# Patient Record
Sex: FEMALE | Race: BLACK OR AFRICAN AMERICAN
[De-identification: names, ages, dates, MRNs, and addresses within clinical notes are randomized per-mention and may not be internally consistent; named-entity substitution may affect disease eponyms.]

---

## 2017-12-30 NOTE — EDM.PDOC
ED HPI GENERAL MEDICAL PROBLEM





- General


Chief Complaint: Eye Problems


Stated Complaint: EYE PAIN, BOTH


Time Seen by Provider: 17 12:33


Source of Information: Reports: Patient


History Limitations: Reports: No Limitations





- History of Present Illness


INITIAL COMMENTS - FREE TEXT/NARRATIVE: 


History of present illness:


[47-year-old female comes in complaining of bilateral eye inflammation with 

pain. Patient indicates that she had gotten some mail order contacts and upon 

placing them in her eyes noticed redness and pain. Patient case that her 

optometrist as well as her ophthalmologist available to her so she came here 

seeking help.]





Review of systems: 


As per history of present illness and below otherwise all systems reviewed and 

negative.





Past medical history: 


As per history of present illness and as reviewed below otherwise 

noncontributory.





Surgical history: 


As per history of present illness and as reviewed below otherwise 

noncontributory.





Social history: 


No reported history of drug or alcohol abuse.





Family history: 


As per history of present illness and as reviewed below otherwise 

noncontributory.





Physical exam:


HEENT: Atraumatic, normocephalic, pupils reactive, bilateral conjunctival 

erythema without exudate, mucous membranes moist, throat clear, neck supple, 

nontender, trachea midline.


Lungs: Clear to auscultation, breath sounds equal bilaterally, chest nontender.


Heart: S1S2, regular, negative for clicks, rubs, or JVD.


Abdomen: Soft, nondistended, nontender. Negative for masses or 

hepatosplenomegaly. Negative for costovertebral tenderness.


Pelvis: Stable nontender.


Genitourinary: Deferred.


Rectal: Deferred.


Extremities: Atraumatic, negative for cords or calf pain. Neurovascular 

unremarkable.


Neuro: Awake, alert, oriented. Cranial nerves II through XII unremarkable. 

Cerebellum unremarkable. Motor and sensory unremarkable throughout. Exam 

nonfocal.








Global assessment is benign save the subjective complaint as noted in history 

of present illness





Diagnostics:


[]





Therapeutics:


[]





Impression: 


[#1 bilateral conjunctivitis]





Plan:


[Tobramycin eye ointment follow-up with optometrist]





Definitive disposition and diagnosis as appropriate pending reevaluation and 

review of above.











- Related Data


 Allergies











Allergy/AdvReac Type Severity Reaction Status Date / Time


 


morphine Allergy  difficulty Verified 05/18/15 21:16





   in  





   breathing  











Home Meds: 


 Home Meds





Dexamethasone/Tobramycin [Tobradex Ophth Oint] 3.5 gm EYEBOTH Q4H #1 tube  [Rx]











Past Medical History


Other OB/BYN History:  x 4





Social & Family History





- Tobacco Use


Smoking Status *Q: Never Smoker





- Alcohol Use


Days Per Week of Alcohol Use: 0





- Recreational Drug Use


Recreational Drug Use: No





ED ROS GENERAL





- Review of Systems


Review Of Systems: See Below (See history of present illness)





ED EXAM GENERAL W FULL EYE





- Physical Exam


Exam: See Below (See history of present illness)





Departure





- Departure


Time of Disposition: 12:35


Disposition: Home, Self-Care 01


Condition: Good


Clinical Impression: 


 Conjunctivitis








- Discharge Information


Prescriptions: 


Dexamethasone/Tobramycin [Tobradex Ophth Oint] 3.5 gm EYEBOTH Q4H #1 tube


Referrals: 


PCP,None [Primary Care Provider] - 


Additional Instructions: 


The following information is given to patients seen in the emergency department 

who are being discharged to home. This information is to outline your options 

for follow-up care. We provide all patients seen in our emergency department 

with a follow-up referral.





The need for follow-up, as well as the timing and circumstances, are variable 

depending upon the specifics of your emergency department visit.





If you don't have a primary care physician on staff, we will provide you with a 

referral. We always advise you to contact your personal physician following an 

emergency department visit to inform them of the circumstance of the visit and 

for follow-up with them and/or the need for any referrals to a consulting 

specialist.





The emergency department will also refer you to a specialist when appropriate. 

This referral assures that you have the opportunity for follow-up care with a 

specialist. All of these measure are taken in an effort to provide you with 

optimal care, which includes your follow-up.





Under all circumstances we always encourage you to contact your private 

physician who remains a resource for coordinating your care. When calling for 

follow-up care, please make the office aware that this follow-up is from your 

recent emergency room visit. If for any reason you are refused follow-up, 

please contact the [facility] Emergency 

Department at [phone] and asked to speak to the emergency department 

charge nurse.





Apply medication as directed








Follow-up with optometry next week when available








Return to ED as needed as discussed

## 2019-08-17 ENCOUNTER — HOSPITAL ENCOUNTER (EMERGENCY)
Dept: HOSPITAL 56 - MW.ED | Age: 49
Discharge: HOME | End: 2019-08-17
Payer: COMMERCIAL

## 2019-08-17 VITALS — SYSTOLIC BLOOD PRESSURE: 132 MMHG | DIASTOLIC BLOOD PRESSURE: 81 MMHG

## 2019-08-17 DIAGNOSIS — Z88.5: ICD-10-CM

## 2019-08-17 DIAGNOSIS — M54.41: Primary | ICD-10-CM

## 2019-08-17 PROCEDURE — 99283 EMERGENCY DEPT VISIT LOW MDM: CPT

## 2019-08-17 PROCEDURE — 96372 THER/PROPH/DIAG INJ SC/IM: CPT

## 2019-08-17 PROCEDURE — 81003 URINALYSIS AUTO W/O SCOPE: CPT
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
## 2020-02-27 NOTE — EDM.PDOC
ED HPI GENERAL MEDICAL PROBLEM





- General


Chief Complaint: Abdominal Pain


Stated Complaint: PAIN ON RT SIDE


Time Seen by Provider: 20 15:49


Source of Information: Reports: Patient


History Limitations: Reports: No Limitations





- History of Present Illness


INITIAL COMMENTS - FREE TEXT/NARRATIVE: 





HISTORY AND PHYSICAL:





History of present illness:


Patient is a 49-year-old female presents the ED with complaint of right flank 

and abdominal pain.  Patient states that the pain began 4 days ago and was 

intermittent but today she states the pain has been constant.  She states she 

has had some frequent urination but denies any dysuria or hematuria.  She 

states that she has been nauseous but denies any vomiting or diarrhea.  She 

denies fevers, chills, chest pain, shortness of breath.  She reports history of 

 sections but otherwise no surgical history.





Review of systems: 


As per history of present illness and below otherwise all systems reviewed and 

negative.





Past medical history: 


As per history of present illness and as reviewed below otherwise 

noncontributory.





Surgical history: 


As per history of present illness and as reviewed below otherwise 

noncontributory.





Social history: 


No reported history of drug or alcohol abuse.





Family history: 


As per history of present illness and as reviewed below otherwise 

noncontributory.





Physical exam:


General: Patient sitting comfortably in no acute distress and nontoxic appearing


HEENT: Atraumatic, normocephalic, pupils reactive, negative for conjunctival 

pallor or scleral icterus, mucous membranes moist, throat clear, neck supple, 

nontender, trachea midline. No meningeal signs. 


Lungs: Clear to auscultation, breath sounds equal bilaterally, chest nontender.


Heart: S1S2, regular, negative for clicks, rubs, or overt murmur.


Abdomen: Right upper and lower abdominal tenderness to palpation. Soft, 

nondistended. Negative for masses or hepatosplenomegaly. Negative for 

costovertebral tenderness. No rigidity, rebound, guarding.


Pelvis: Stable nontender.


Genitourinary: Deferred.


Rectal: Deferred.


Extremities: Atraumatic, negative for cords or calf pain. Neurovascular 

unremarkable.


Neuro: Awake, alert, oriented. Cranial nerves II through XII unremarkable. 

Cerebellum unremarkable. Motor and sensory unremarkable throughout. Exam 

nonfocal.





Notes: 





Diagnostics:


CBC, CMP, lipase, UA, urine hcg 





Therapeutics:


1L NS IV


30mg Toradol IV 


Percocet 10-325mg PO 





Prescriptions:


Percocet (#10) 





Impression: 


Uterine fibroids, abdominal pain





Plan:


Take motrin as needed for pain.


You may take Percocet as needed for severe pain, do not take while driving or 

with alcohol as it may make you drowsy


Follow up with primary care provider


Return to ED as needed as discussed 





Definitive disposition and diagnosis as appropriate pending reevaluation and 

review of above.








  ** Right Abdomen


Pain Score (Numeric/FACES): 8





- Related Data


 Allergies











Allergy/AdvReac Type Severity Reaction Status Date / Time


 


morphine Allergy  difficulty Verified 20 15:34





   in  





   breathing  











Home Meds: 


 Home Meds





oxyCODONE HCl/Acetaminophen [Percocet 5-325 mg Tablet] 1 each PO Q6H PRN #10 

tablet 20 [Rx]











Past Medical History





- Past Health History


Medical/Surgical History: Denies Medical/Surgical History


HEENT History: Reports: None


Cardiovascular History: Reports: None


Respiratory History: Reports: None


Gastrointestinal History: Reports: None


Genitourinary History: Reports: None


OB/GYN History: Reports: Pregnancy


Other OB/GYN History:  x 4


Musculoskeletal History: Reports: None


Neurological History: Reports: None


Psychiatric History: Reports: Anxiety, PTSD


Endocrine/Metabolic History: Reports: None


Hematologic History: Reports: None


Immunologic History: Reports: None


Oncologic (Cancer) History: Reports: None


Dermatologic History: Reports: None





- Infectious Disease History


Infectious Disease History: Reports: Chicken Pox





- Past Surgical History


Head Surgeries/Procedures: Reports: None


Female  Surgical History: Reports:  Section





Social & Family History





- Family History


Family Medical History: Noncontributory





- Tobacco Use


Smoking Status *Q: Never Smoker





- Caffeine Use


Caffeine Use: Reports: None





- Recreational Drug Use


Recreational Drug Use: No





ED ROS GENERAL





- Review of Systems


Review Of Systems: Comprehensive ROS is negative, except as noted in HPI.





ED EXAM, GI/ABD





- Physical Exam


Exam: See Below (see dictation)





Course





- Vital Signs


Last Recorded V/S: 


 Last Vital Signs











Temp  98.9 F   20 15:29


 


Pulse  87   20 15:29


 


Resp  18   20 15:29


 


BP  114/87   20 15:29


 


Pulse Ox  100   20 15:29














- Orders/Labs/Meds


Orders: 


 Active Orders 24 hr











 Category Date Time Status


 


 Sodium Chloride 0.9% [Saline Flush] Med  20 15:43 Active





 10 ml FLUSH ASDIRECTED PRN   


 


 Sodium Chloride 0.9% [Saline Flush] Med  20 15:43 Active





 2.5 ml FLUSH ASDIRECTED PRN   


 


 Saline Lock Insert [OM.PC] Stat Oth  20 15:43 Ordered








 Medication Orders





Sodium Chloride (Saline Flush)  10 ml FLUSH ASDIRECTED PRN


   PRN Reason: Keep Vein Open


Sodium Chloride (Saline Flush)  2.5 ml FLUSH ASDIRECTED PRN


   PRN Reason: Keep Vein Open








Labs: 


 Laboratory Tests











  20 Range/Units





  15:25 15:25 16:21 


 


WBC    8.24  (4.0-11.0)  K/uL


 


RBC    4.14 L  (4.30-5.90)  M/uL


 


Hgb    10.0 L  (12.0-16.0)  g/dL


 


Hct    32.3 L  (36.0-46.0)  %


 


MCV    78.0 L  (80.0-98.0)  fL


 


MCH    24.2 L  (27.0-32.0)  pg


 


MCHC    31.0  (31.0-37.0)  g/dL


 


RDW Std Deviation    47.1  (28.0-62.0)  fl


 


RDW Coeff of Zulma    17 H  (11.0-15.0)  %


 


Plt Count    356  (150-400)  K/uL


 


MPV    9.70  (7.40-12.00)  fL


 


Neut % (Auto)    51.7  (48.0-80.0)  %


 


Lymph % (Auto)    36.2  (16.0-40.0)  %


 


Mono % (Auto)    9.6  (0.0-15.0)  %


 


Eos % (Auto)    2.3  (0.0-7.0)  %


 


Baso % (Auto)    0.2  (0.0-1.5)  %


 


Neut # (Auto)    4.3  (1.4-5.7)  K/uL


 


Lymph # (Auto)    3.0 H  (0.6-2.4)  K/uL


 


Mono # (Auto)    0.8  (0.0-0.8)  K/uL


 


Eos # (Auto)    0.2  (0.0-0.7)  K/uL


 


Baso # (Auto)    0.0  (0.0-0.1)  K/uL


 


Nucleated RBC %    0.0  /100WBC


 


Nucleated RBCs #    0  K/uL


 


Sodium     (136-145)  mmol/L


 


Potassium     (3.5-5.1)  mmol/L


 


Chloride     ()  mmol/L


 


Carbon Dioxide     (21.0-32.0)  mmol/L


 


BUN     (7.0-18.0)  mg/dL


 


Creatinine     (0.6-1.0)  mg/dL


 


Est Cr Clr Drug Dosing     mL/min


 


Estimated GFR (MDRD)     ml/min


 


Glucose     ()  mg/dL


 


Calcium     (8.5-10.1)  mg/dL


 


Total Bilirubin     (0.2-1.0)  mg/dL


 


AST     (15-37)  IU/L


 


ALT     (14-63)  IU/L


 


Alkaline Phosphatase     ()  U/L


 


Troponin I     (0.000-0.056)  ng/mL


 


Total Protein     (6.4-8.2)  g/dL


 


Albumin     (3.4-5.0)  g/dL


 


Globulin     (2.6-4.0)  g/dL


 


Albumin/Globulin Ratio     (0.9-1.6)  


 


Lipase     ()  U/L


 


Urine Color  YELLOW    


 


Urine Appearance  CLEAR    


 


Urine pH  8.0    (5.0-8.0)  


 


Ur Specific Gravity  1.020    (1.001-1.035)  


 


Urine Protein  NEGATIVE    (NEGATIVE)  mg/dL


 


Urine Glucose (UA)  NEGATIVE    (NEGATIVE)  mg/dL


 


Urine Ketones  NEGATIVE    (NEGATIVE)  mg/dL


 


Urine Occult Blood  NEGATIVE    (NEGATIVE)  


 


Urine Nitrite  NEGATIVE    (NEGATIVE)  


 


Urine Bilirubin  NEGATIVE    (NEGATIVE)  


 


Urine Urobilinogen  0.2    (<2.0)  EU/dL


 


Ur Leukocyte Esterase  NEGATIVE    (NEGATIVE)  


 


Urine HCG, Qual   NEGATIVE   (NEGATIVE)  














  20 Range/Units





  16:21 16:21 


 


WBC    (4.0-11.0)  K/uL


 


RBC    (4.30-5.90)  M/uL


 


Hgb    (12.0-16.0)  g/dL


 


Hct    (36.0-46.0)  %


 


MCV    (80.0-98.0)  fL


 


MCH    (27.0-32.0)  pg


 


MCHC    (31.0-37.0)  g/dL


 


RDW Std Deviation    (28.0-62.0)  fl


 


RDW Coeff of Zulma    (11.0-15.0)  %


 


Plt Count    (150-400)  K/uL


 


MPV    (7.40-12.00)  fL


 


Neut % (Auto)    (48.0-80.0)  %


 


Lymph % (Auto)    (16.0-40.0)  %


 


Mono % (Auto)    (0.0-15.0)  %


 


Eos % (Auto)    (0.0-7.0)  %


 


Baso % (Auto)    (0.0-1.5)  %


 


Neut # (Auto)    (1.4-5.7)  K/uL


 


Lymph # (Auto)    (0.6-2.4)  K/uL


 


Mono # (Auto)    (0.0-0.8)  K/uL


 


Eos # (Auto)    (0.0-0.7)  K/uL


 


Baso # (Auto)    (0.0-0.1)  K/uL


 


Nucleated RBC %    /100WBC


 


Nucleated RBCs #    K/uL


 


Sodium  142   (136-145)  mmol/L


 


Potassium  4.4   (3.5-5.1)  mmol/L


 


Chloride  106   ()  mmol/L


 


Carbon Dioxide  26.2   (21.0-32.0)  mmol/L


 


BUN  9   (7.0-18.0)  mg/dL


 


Creatinine  0.8   (0.6-1.0)  mg/dL


 


Est Cr Clr Drug Dosing  68.02   mL/min


 


Estimated GFR (MDRD)  > 60.0   ml/min


 


Glucose  93   ()  mg/dL


 


Calcium  9.1   (8.5-10.1)  mg/dL


 


Total Bilirubin  0.5   (0.2-1.0)  mg/dL


 


AST  23   (15-37)  IU/L


 


ALT  29   (14-63)  IU/L


 


Alkaline Phosphatase  71   ()  U/L


 


Troponin I   < 0.050  (0.000-0.056)  ng/mL


 


Total Protein  7.4   (6.4-8.2)  g/dL


 


Albumin  3.4   (3.4-5.0)  g/dL


 


Globulin  4.0   (2.6-4.0)  g/dL


 


Albumin/Globulin Ratio  0.9   (0.9-1.6)  


 


Lipase  77   ()  U/L


 


Urine Color    


 


Urine Appearance    


 


Urine pH    (5.0-8.0)  


 


Ur Specific Gravity    (1.001-1.035)  


 


Urine Protein    (NEGATIVE)  mg/dL


 


Urine Glucose (UA)    (NEGATIVE)  mg/dL


 


Urine Ketones    (NEGATIVE)  mg/dL


 


Urine Occult Blood    (NEGATIVE)  


 


Urine Nitrite    (NEGATIVE)  


 


Urine Bilirubin    (NEGATIVE)  


 


Urine Urobilinogen    (<2.0)  EU/dL


 


Ur Leukocyte Esterase    (NEGATIVE)  


 


Urine HCG, Qual    (NEGATIVE)  











Meds: 


Medications











Generic Name Dose Route Start Last Admin





  Trade Name Freq  PRN Reason Stop Dose Admin


 


Sodium Chloride  10 ml  20 15:43  





  Saline Flush  FLUSH   





  ASDIRECTED PRN   





  Keep Vein Open   





     





     





     


 


Sodium Chloride  2.5 ml  20 15:43  





  Saline Flush  FLUSH   





  ASDIRECTED PRN   





  Keep Vein Open   





     





     





     














Discontinued Medications














Generic Name Dose Route Start Last Admin





  Trade Name Freq  PRN Reason Stop Dose Admin


 


Al Hydroxide/Mg Hydroxide 15  0 ml  20 17:13  20 17:23





  ml/ Lidocaine HCl 5 ml  PO  20 17:14  1 each





  ONETIME ONE   Administration





     





     





     





     


 


Dicyclomine HCl  10 mg  20 17:43  20 17:46





  Bentyl  PO  20 17:44  10 mg





  ONETIME ONE   Administration





     





     





     





     


 


Sodium Chloride  1,000 mls @ 999 mls/hr  20 15:43  20 16:24





  Normal Saline  IV  20 16:43  999 mls/hr





  STAT ONE   Administration





     





     





     





     


 


Iopamidol  100 ml  20 18:55  20 18:56





  Isovue Multipack-370 (76%)  IVPUSH  20 18:56  100 ml





  ONETIME STA   Administration





     





     





     





     


 


Ketorolac Tromethamine  30 mg  20 15:43  20 16:22





  Toradol  IVPUSH  20 15:44  30 mg





  ONETIME ONE   Administration





     





     





     





     


 


Oxycodone/Acetaminophen  1 tab  20 18:26  20 18:31





  Percocet 325-10 Mg  PO  20 18:27  1 tab





  ONETIME ONE   Administration





     





     





     





     














Departure





- Departure


Time of Disposition: 19:26


Disposition: Home, Self-Care 01


Condition: Good


Clinical Impression: 


 Uterine fibroid, Abdominal pain








- Discharge Information


Referrals: 


PCP,None [Primary Care Provider] - 


Forms:  ED Department Discharge


Additional Instructions: 


The following information is given to patients seen in the emergency department 

who are being discharged to home. This information is to outline your options 

for follow-up care. We provide all patients seen in our emergency department 

with a follow-up referral.





The need for follow-up, as well as the timing and circumstances, are variable 

depending upon the specifics of your emergency department visit.





If you don't have a primary care physician on staff, we will provide you with a 

referral. We always advise you to contact your personal physician following an 

emergency department visit to inform them of the circumstance of the visit and 

for follow-up with them and/or the need for any referrals to a consulting 

specialist.





The emergency department will also refer you to a specialist when appropriate. 

This referral assures that you have the opportunity for follow-up care with a 

specialist. All of these measure are taken in an effort to provide you with 

optimal care, which includes your follow-up.





Under all circumstances we always encourage you to contact your private 

physician who remains a resource for coordinating your care. When calling for 

follow-up care, please make the office aware that this follow-up is from your 

recent emergency room visit. If for any reason you are refused follow-up, 

please contact the [facility] Emergency 

Department at [phone] and asked to speak to the emergency department 

charge nurse.





[facility]


Primary Care


[address] [place] [address]


Phone: [phone]


Fax: [phone]





[place] [address]


Phone: [phone]


Fax: [phone]








Take motrin as needed for pain.


You may take Percocet as needed for severe pain, do not take while driving or 

with alcohol as it may make you drowsy


Follow up with primary care provider


Return to ED as needed as discussed 














Sepsis Event Note





- Evaluation


Sepsis Screening Result: No Definite Risk





- Focused Exam


Vital Signs: 


 Vital Signs











  Temp Pulse Resp BP Pulse Ox


 


 20 15:29  98.9 F  87  18  114/87  100











Date Exam was Performed: 20


Time Exam was Performed: 19:26





- My Orders


Last 24 Hours: 


My Active Orders





20 15:43


Sodium Chloride 0.9% [Saline Flush]   10 ml FLUSH ASDIRECTED PRN 


Sodium Chloride 0.9% [Saline Flush]   2.5 ml FLUSH ASDIRECTED PRN 


Saline Lock Insert [OM.PC] Stat 














- Assessment/Plan


Last 24 Hours: 


My Active Orders





20 15:43


Sodium Chloride 0.9% [Saline Flush]   10 ml FLUSH ASDIRECTED PRN 


Sodium Chloride 0.9% [Saline Flush]   2.5 ml FLUSH ASDIRECTED PRN 


Saline Lock Insert [OM.PC] Stat

## 2020-02-27 NOTE — US
Limited abdominal ultrasound: Multiple real-time images of the right 

upper abdomen were obtained.

 

Comparison: No prior abdominal imaging is available.

 

Pancreas is incompletely seen.  Visualized portion of the pancreas 

shows no discrete abnormality.  Gallbladder contains no shadowing 

gallstones.  No gallbladder wall thickening or biliary duct dilatation

 is seen.  Complex cystic and solid abnormality is seen off the left 

lobe of the liver or indenting the liver measuring up to 6.0 cm.  

Additional mid abdominal wall mass measuring about 15 cm in size is 

seen.  Uncertain as to origin of this finding.  No additional 

abnormality is seen within the liver.  Gallbladder shows no shadowing 

gallstones.  No gallbladder wall thickening or biliary duct dilatation

 is seen.  Right kidney shows no hydronephrosis or mass.  Right kidney

 has a length of 11.1 cm.

 

Impression:

1.  Mass off the left lobe of the liver or possibly indenting the 

liver and arising off another abdominal structure which appears as a 

complex cystic abnormality.  This measures up to 6.0 cm.

2.  Additional solid mass within the mid abdomen measuring up to about

 15 cm in size.

3.  No additional abnormality is seen on right upper quadrant 

abdominal ultrasound.

 

Note: 2 findings as noted above.  Etiology not certain on ultrasound 

exam.  CT abdomen and pelvis is recommended which should include both 

IV and oral contrast.  This CT study can be performed non-emergently 

if desired.

 

Diagnostic code #9

 

This report was dictated in [place] Standard Time

## 2020-02-27 NOTE — CT
CT abdomen and pelvis

 

Technique: Multiple axial sections were obtained from above the dome 

of the diaphragm inferiorly through the pubic symphysis.  Intravenous 

contrast was utilized.  No oral contrast has been given.

 

Findings: Mass within the midabdomen appears to arise off the uterus 

which is felt to represent a multi-fibroid uterus.  Overall the 

collection of fibroids measure 15.8 cm in craniocaudal dimension, 14.9

 cm in AP dimension and 13.0 cm in transverse dimension.

 

Complicated cystic area noted off the left lobe of the liver on 

ultrasound correlates to a presumed degenerating fibroid within the 

uterus.

 

Visualized lung bases show nothing acute.

 

Liver contains no focal abnormality.  Gallbladder contains no 

gallstones.  Adrenal glands contain no nodule.  Cyst appears noted 

within the left lobe of the left kidney measuring 1.9 cm.  Smaller 

cortical abnormality also noted within left kidney measuring 5 mm most

 likely due to additional cyst.  No additional abnormality seen within

 the kidneys.  Pancreas shows no discrete abnormality.  Spleen appears

 within normal limits.  Aorta shows no aneurysm.  No retroperitoneal 

adenopathy or mesenteric abnormalities are seen.  No discrete pelvic 

abnormality is seen.  No free fluid or inflammatory change is seen.  

Appendix not visualized

 

Bone window settings were obtained which shows degenerative change 

within the apophyseal joints at L4-5 with spondylolisthesis.

 

Impression:

1.  Enlarged uterus showing multiple fibroids which causes the 

abdominal mass noted on recent ultrasound.  Measurements as noted 

above.

2.  Complicated cystic area is noted off the left lobe of the liver 

which appears to arise from the uterus most likely due to degenerated 

fibroid.

3.  Other findings believed to be incidental as noted above.

 

Diagnostic code #3

 

This report was dictated in [place] Standard Time

## 2020-02-27 NOTE — CR
Chest: Portable view of the chest was obtained.

 

Comparison: No prior chest imaging is available.

 

Heart size is somewhat generous but accentuated portable technique.  

Upper mediastinum is normal.  Lungs are clear with no acute 

parenchymal change.  Bony structures shows no discrete abnormality.

 

Impression:

1.  Nothing acute is appreciated on portable chest x-ray.

 

Diagnostic code #1

 

This report was dictated in [place] Standard Time

## 2020-10-15 NOTE — PCM.HP.2
H&P History of Present Illness





- General


Date of Service: 10/15/20


Admit Problem/Dx: 


                           Admission Diagnosis/Problem





Admission Diagnosis/Problem      Acute coronary syndrome











- History of Present Illness


Initial Comments - Free Text/Narative: 





 Patient is a 48 y/o f with no significant known PMH except chronic headaches  

presents with chest pain that is left-sided that began this morning after she 

woke up she states after she got out of bed it felt like a "lightening bolt 

going through her chest" radiated up and down the left upper quadrant up into 

the left shoulder she had some shortness of breath a couple days ago but not 

today with this she denies any cough or fever she not had any leg pain or leg 

swelling she is not had any sweating nausea or vomiting nothing seems to make 

this pain better or worse.  No history of hypertension no diabetes no smoking. 

Patient states she moved here from [facility] and ever since has been sedentary

and has gained a lot of weight. She is alos under a lot of stress at work. 


Initial BP was elevated in ER, Patient underwent CTA chest an abdomen, PE was 

ruled out, CT pelvis showed fibroid. EKG was unremarkable, 1st troponin was 

negative. Nitro helped to ease the pain. Patient is being admitted for 

observation overnight to r/o ACS. 


  ** left chest


Pain Score (Numeric/FACES): 7





- Related Data


Allergies/Adverse Reactions: 


                                    Allergies











Allergy/AdvReac Type Severity Reaction Status Date / Time


 


morphine Allergy  difficulty Verified 10/15/20 22:50





   in  





   breathing  














Past Medical History





- Past Health History


Medical/Surgical History: Denies Medical/Surgical History


HEENT History: Reports: None


Cardiovascular History: Reports: None


Respiratory History: Reports: None


Gastrointestinal History: Reports: None


Genitourinary History: Reports: None


OB/GYN History: Reports: Pregnancy


Other OB/BYN History:  x 4


Musculoskeletal History: Reports: None


Neurological History: Reports: None


Psychiatric History: Reports: Anxiety, PTSD


Endocrine/Metabolic History: Reports: None


Hematologic History: Reports: None


Immunologic History: Reports: None


Oncologic (Cancer) History: Reports: None


Dermatologic History: Reports: None





- Infectious Disease History


Infectious Disease History: Reports: Chicken Pox





- Past Surgical History


Head Surgeries/Procedures: Reports: None


Female  Surgical History: Reports:  Section





Social & Family History





- Family History


Family Medical History: Noncontributory





- Tobacco Use


Tobacco Use Status *Q: Never Tobacco User


Second Hand Smoke Exposure: No





- Caffeine Use


Caffeine Use: Reports: Coffee





- Recreational Drug Use


Recreational Drug Use: No





H&P Review of Systems





- Review of Systems:


Review Of Systems: See Below


General: Denies: Fever, Chills, Malaise


Pulmonary: Denies: Shortness of Breath, Wheezing


Cardiovascular: Reports: Chest Pain.  Denies: Palpitations, Dyspnea on Exertion,

Orthopnea, Syncope, Claudication


Gastrointestinal: Denies: Abdominal Pain, Anorexia, Black Stool


Genitourinary: Denies: Dysuria, Frequency, Burning


Musculoskeletal: Denies: Neck Pain, Shoulder Pain, Arm Pain, Back Pain


Skin: Denies: Cyanosis, Jaundice, Mottled, Pallor


Psychiatric: Denies: Confusion, Depression, Mood Lability





Exam





- Exam


Exam: See Below





- Vital Signs


Vital Signs: 


                                Last Vital Signs











Temp  36.7 C   10/15/20 22:03


 


Pulse  94   10/15/20 22:03


 


Resp  18   10/15/20 22:03


 


BP  164/86 H  10/15/20 22:03


 


Pulse Ox  98   10/15/20 22:03











Weight: 88.451 kg





- Exam


Quality Assessment: No: Supplemental Oxygen


General: Alert, Oriented


HEENT: Conjunctiva Clear


Neck: Supple, Trachea Midline


Cardiovascular: Regular Rate, Regular Rhythm, Normal S1, Normal S2


GI/Abdominal Exam: Soft, Non-Tender, No Organomegaly





- Patient Data


Lab Results Last 24 hrs: 


                         Laboratory Results - last 24 hr











  10/15/20 10/15/20 10/15/20 Range/Units





  16:48 16:48 16:48 


 


WBC  8.08    (4.0-11.0)  K/uL


 


RBC  4.11 L    (4.30-5.90)  M/uL


 


Hgb  8.8 L    (12.0-16.0)  g/dL


 


Hct  29.6 L    (36.0-46.0)  %


 


MCV  72.0 L    (80.0-98.0)  fL


 


MCH  21.4 L    (27.0-32.0)  pg


 


MCHC  29.7 L    (31.0-37.0)  g/dL


 


RDW Std Deviation  47.2    (28.0-62.0)  fl


 


RDW Coeff of Zulma  18 H    (11.0-15.0)  %


 


Plt Count  418 H    (150-400)  K/uL


 


MPV  9.10    (7.40-12.00)  fL


 


Neut % (Auto)  49.7    (48.0-80.0)  %


 


Lymph % (Auto)  35.8    (16.0-40.0)  %


 


Mono % (Auto)  10.1    (0.0-15.0)  %


 


Eos % (Auto)  4.2    (0.0-7.0)  %


 


Baso % (Auto)  0.2    (0.0-1.5)  %


 


Neut # (Auto)  4.0    (1.4-5.7)  K/uL


 


Lymph # (Auto)  2.9 H    (0.6-2.4)  K/uL


 


Mono # (Auto)  0.8    (0.0-0.8)  K/uL


 


Eos # (Auto)  0.3    (0.0-0.7)  K/uL


 


Baso # (Auto)  0.0    (0.0-0.1)  K/uL


 


Nucleated RBC %  0.0    /100WBC


 


Nucleated RBCs #  0    K/uL


 


Sodium   138   (136-145)  mmol/L


 


Potassium   3.8   (3.5-5.1)  mmol/L


 


Chloride   102   ()  mmol/L


 


Carbon Dioxide   26.5   (21.0-32.0)  mmol/L


 


BUN   10   (7.0-18.0)  mg/dL


 


Creatinine   0.8   (0.6-1.0)  mg/dL


 


Est Cr Clr Drug Dosing   71.14   mL/min


 


Estimated GFR (MDRD)   > 60.0   ml/min


 


Glucose   95   ()  mg/dL


 


Calcium   8.5   (8.5-10.1)  mg/dL


 


Iron    26 L  ()  ug/dL


 


TIBC    446  (250-450)  ug/dL


 


% Saturation    5.83 L  (20-55)  %


 


Ferritin    10  (8-252)  ng/mL


 


Total Bilirubin   0.6   (0.2-1.0)  mg/dL


 


AST   24   (15-37)  IU/L


 


ALT   35   (14-63)  IU/L


 


Alkaline Phosphatase   74   ()  U/L


 


Troponin I   < 0.050   (0.000-0.056)  ng/mL


 


Total Protein   7.5   (6.4-8.2)  g/dL


 


Albumin   3.5   (3.4-5.0)  g/dL


 


Globulin   4.0   (2.6-4.0)  g/dL


 


Albumin/Globulin Ratio   0.9   (0.9-1.6)  


 


Lipase   67 L   ()  U/L


 


SARS-CoV-2 RNA (DOMINIK)     (NEGATIVE)  














  10/15/20 10/15/20 Range/Units





  19:42 20:33 


 


WBC    (4.0-11.0)  K/uL


 


RBC    (4.30-5.90)  M/uL


 


Hgb    (12.0-16.0)  g/dL


 


Hct    (36.0-46.0)  %


 


MCV    (80.0-98.0)  fL


 


MCH    (27.0-32.0)  pg


 


MCHC    (31.0-37.0)  g/dL


 


RDW Std Deviation    (28.0-62.0)  fl


 


RDW Coeff of Zulma    (11.0-15.0)  %


 


Plt Count    (150-400)  K/uL


 


MPV    (7.40-12.00)  fL


 


Neut % (Auto)    (48.0-80.0)  %


 


Lymph % (Auto)    (16.0-40.0)  %


 


Mono % (Auto)    (0.0-15.0)  %


 


Eos % (Auto)    (0.0-7.0)  %


 


Baso % (Auto)    (0.0-1.5)  %


 


Neut # (Auto)    (1.4-5.7)  K/uL


 


Lymph # (Auto)    (0.6-2.4)  K/uL


 


Mono # (Auto)    (0.0-0.8)  K/uL


 


Eos # (Auto)    (0.0-0.7)  K/uL


 


Baso # (Auto)    (0.0-0.1)  K/uL


 


Nucleated RBC %    /100WBC


 


Nucleated RBCs #    K/uL


 


Sodium    (136-145)  mmol/L


 


Potassium    (3.5-5.1)  mmol/L


 


Chloride    ()  mmol/L


 


Carbon Dioxide    (21.0-32.0)  mmol/L


 


BUN    (7.0-18.0)  mg/dL


 


Creatinine    (0.6-1.0)  mg/dL


 


Est Cr Clr Drug Dosing    mL/min


 


Estimated GFR (MDRD)    ml/min


 


Glucose    ()  mg/dL


 


Calcium    (8.5-10.1)  mg/dL


 


Iron    ()  ug/dL


 


TIBC    (250-450)  ug/dL


 


% Saturation    (20-55)  %


 


Ferritin    (8-252)  ng/mL


 


Total Bilirubin    (0.2-1.0)  mg/dL


 


AST    (15-37)  IU/L


 


ALT    (14-63)  IU/L


 


Alkaline Phosphatase    ()  U/L


 


Troponin I   < 0.050  (0.000-0.056)  ng/mL


 


Total Protein    (6.4-8.2)  g/dL


 


Albumin    (3.4-5.0)  g/dL


 


Globulin    (2.6-4.0)  g/dL


 


Albumin/Globulin Ratio    (0.9-1.6)  


 


Lipase    ()  U/L


 


SARS-CoV-2 RNA (DOMINIK)  NEGATIVE   (NEGATIVE)  











Result Diagrams: 


                                 10/15/20 16:48





                                 10/15/20 16:48





Sepsis Event Note





- Evaluation


Sepsis Screening Result: No Definite Risk





- Focused Exam


Vital Signs: 


                                   Vital Signs











  Temp Pulse Resp BP BP BP Pulse Ox


 


 10/15/20 22:03  36.7 C  94  18    164/86 H  98


 


 10/15/20 19:31   96  18   125/85   99


 


 10/15/20 19:01     122/83   


 


 10/15/20 18:35   91  18   127/76   98


 


 10/15/20 17:50   85  18   137/80   100


 


 10/15/20 16:35  35.6 C L  95  20   157/101 H   99














- Problem List


(1) Fibroid


SNOMED Code(s): 201487699957252, 206776074381905


   ICD Code: D21.9 - BENIGN NEOPLASM OF CONNECTIVE AND OTHER SOFT TISSUE, UNSP  

Status: Acute   Current Visit: Yes   





(2) Anemia


SNOMED Code(s): 649562269


   ICD Code: D64.9 - ANEMIA, UNSPECIFIED   Status: Acute   Current Visit: Yes   


Problem List Initiated/Reviewed/Updated: Yes


Orders Last 24hrs: 


                               Active Orders 24 hr











 Category Date Time Status


 


 Patient Status [ADT] Routine ADT  10/15/20 19:32 Active


 


 Ambulate [RC] ASDIRECTED Care  10/15/20 20:22 Active


 


 Antiembolic Devices [RC] PER UNIT ROUTINE Care  10/15/20 20:23 Active


 


 EKG Documentation Completion [RC] STAT Care  10/15/20 16:49 Active


 


 Oxygen Therapy [RC] PRN Care  10/15/20 20:22 Active


 


 RT Aerosol Therapy [RC] ASDIRECTED Care  10/15/20 20:24 Active


 


 Telemetry Monitoring [Cardiac Monitoring] [RC] Q8H Care  10/15/20 19:52 Active


 


 VTE/DVT Education [RC] PER UNIT ROUTINE Care  10/15/20 20:22 Active


 


 Vital Signs [RC] Q4H Care  10/15/20 20:22 Active


 


 Heart Healthy Diet [DIET] Diet  10/15/20 Dinner Active


 


 Ang Chest [CT] Stat Exams  10/15/20 18:00 Taken


 


 CBC WITH AUTO DIFF [HEME] AM Lab  10/16/20 05:11 Ordered


 


 GLYCOSYLATED HEMOGLOBIN,HGBA1C [CHEM] AM Lab  10/17/20 05:11 Ordered


 


 LIPID PANEL [CHEM] AM Lab  10/16/20 05:11 Ordered


 


 TROPONIN I [CHEM] Routine Lab  10/16/20 00:00 Ordered


 


 TSH [CHEM] AM Lab  10/16/20 05:11 Ordered


 


 UA W/EMILIE RFLX IF INDICATED [URIN] Stat Lab  10/15/20 20:28 Ordered


 


 Acetaminophen [TylenoL] Med  10/15/20 20:29 Active





 650 mg PO Q6H PRN   


 


 Albuterol/Ipratropium [DuoNeb 3.0-0.5 MG/3 ML] Med  10/15/20 20:22 Active





 3 ml NEB Q4HRRT PRN   


 


 Aspirin Med  10/16/20 09:00 Active





 81 mg PO DAILY   


 


 Ferrous Sulfate Med  10/16/20 09:00 Ordered





 300 mg PO DAILY   


 


 Ketorolac [Toradol] Med  10/15/20 22:23 Ordered





 10 mg PO Q6H PRN   


 


 Sodium Chloride 0.9% [Saline Flush] Med  10/15/20 16:49 Active





 10 ml FLUSH ASDIRECTED PRN   


 


 Sodium Chloride 0.9% [Saline Flush] Med  10/15/20 16:49 Active





 2.5 ml FLUSH ASDIRECTED PRN   


 


 Sodium Chloride 0.9% [Saline Flush] Med  10/15/20 16:49 Active





 2.5 ml FLUSH ASDIRECTED PRN   


 


 amLODIPine [Norvasc] Med  10/15/20 22:30 Ordered





 5 mg PO DAILY   


 


 Saline Lock Insert [OM.PC] Stat Oth  10/15/20 16:49 Ordered


 


 Sequential Compression Device [OM.PC] Per Unit Routine Oth  10/15/20 20:23 

Ordered


 


 Resuscitation Status Routine Resus Stat  10/15/20 20:22 Ordered








                                Medication Orders





Acetaminophen (Tylenol)  650 mg PO Q6H PRN


   PRN Reason: Pain


   Last Admin: 10/15/20 22:21  Dose: 650 mg


   Documented by: AUSTEN


Albuterol/Ipratropium (Duoneb 3.0-0.5 Mg/3 Ml)  3 ml NEB Q4HRRT PRN


   PRN Reason: Shortness Of Breath/wheezing


Amlodipine Besylate (Norvasc)  5 mg PO DAILY ANNAMARIE


Aspirin (Aspirin)  81 mg PO DAILY ANNAMARIE


Ketorolac Tromethamine (Toradol)  10 mg PO Q6H PRN


   PRN Reason: Pain


   Stop: 10/20/20 22:24


Sodium Chloride (Saline Flush)  10 ml FLUSH ASDIRECTED PRN


   PRN Reason: Keep Vein Open


   Last Admin: 10/15/20 17:04  Dose: 10 ml


   Documented by: ADAM


Sodium Chloride (Saline Flush)  2.5 ml FLUSH ASDIRECTED PRN


   PRN Reason: Keep Vein Open


   Last Admin: 10/15/20 17:05  Dose: 2.5 ml


   Documented by: ADAM


Sodium Chloride (Saline Flush)  2.5 ml FLUSH ASDIRECTED PRN


   PRN Reason: Keep Vein Open


   Last Admin: 10/15/20 17:05  Dose: 2.5 ml


   Documented by: ADAM








Assessment/Plan Comment:: 





48 y/o F admitted for chest pain,,ACS rule out


Trend serial cardiac enzymes


cont monitor on tele


Start ;low dose amlodipine


start baby ASA


Check TSH, Lipid panel, Glycated Hb


Anemia, check Iron, ferritin


monitor and replete electrolytes as needed


Tylenol and Toradol for pain as needed


Stress test on outpatient basis.

## 2020-10-15 NOTE — EDM.PDOC
<Tito Mcginnis - Last Filed: 10/15/20 18:56>





ED HPI GENERAL MEDICAL PROBLEM





- General


Chief Complaint: Chest Pain


Stated Complaint: CHEST PAIN


Time Seen by Provider: 10/15/20 16:41





- History of Present Illness


INITIAL COMMENTS - FREE TEXT/NARRATIVE: 





History of present illness:


[] Patient presents with chest pain that is left-sided that began this morning 

after she woke up she states after she got out of bed it felt like a lightening 

bolt going through her chest radiated up and down the left upper quadrant up 

into the left shoulder she had some shortness of breath a couple days ago but 

not today with this she denies any cough or fever she not had any leg pain or 

leg swelling she is not had any sweating nausea or vomiting nothing seems to 

make this pain better or worse.  No history of hypertension no diabetes no 

smoking





Review of systems: 


As per history of present illness and below otherwise all systems reviewed and 

negative.





Past medical history: 


As per history of present illness and as reviewed below otherwise 

noncontributory.





Surgical history: 


As per history of present illness and as reviewed below otherwise 

noncontributory.





Social history: 


No reported history of drug or alcohol abuse.





Family history: 


As per history of present illness and as reviewed below otherwise 

noncontributory.





Physical exam:


HEENT: Atraumatic, normocephalic, pupils reactive, negative for conjunctival 

pallor or scleral icterus, mucous membranes moist, throat clear, neck supple, 

nontender, trachea midline.


Lungs: Clear to auscultation, breath sounds equal bilaterally, chest nontender.


Heart: S1S2, regular, negative for clicks, rubs, or JVD.


Abdomen: Soft, nondistended, nontender. Negative for masses or 

hepatosplenomegaly. Negative for costovertebral tenderness.


Pelvis: Stable nontender.


Genitourinary: Deferred.


Rectal: Deferred.


Extremities: Atraumatic, negative for cords or calf pain. Neurovascular 

unremarkable.


Neuro: Awake, alert, oriented. Cranial nerves II through XII unremarkable. C

erebellum unremarkable. Motor and sensory unremarkable throughout. Exam 

nonfocal.





Diagnostics:


[]





Therapeutics:


[]





Impression: Chest pain


Plan: Chest pain work-up reassess the patient


[]





Definitive disposition and diagnosis as appropriate pending reevaluation and 

review of above.





  ** left chest


Pain Score (Numeric/FACES): 7





- Related Data


                                    Allergies











Allergy/AdvReac Type Severity Reaction Status Date / Time


 


morphine Allergy  difficulty Verified 10/15/20 16:40





   in  





   breathing  














Past Medical History





- Past Health History


Medical/Surgical History: Denies Medical/Surgical History


HEENT History: Reports: None


Cardiovascular History: Reports: None


Respiratory History: Reports: None


Gastrointestinal History: Reports: None


Genitourinary History: Reports: None


OB/GYN History: Reports: Pregnancy


Other OB/GYN History:  x 4


Musculoskeletal History: Reports: None


Neurological History: Reports: None


Psychiatric History: Reports: Anxiety, PTSD


Endocrine/Metabolic History: Reports: None


Hematologic History: Reports: None


Immunologic History: Reports: None


Oncologic (Cancer) History: Reports: None


Dermatologic History: Reports: None





- Infectious Disease History


Infectious Disease History: Reports: Chicken Pox





- Past Surgical History


Head Surgeries/Procedures: Reports: None


Female  Surgical History: Reports:  Section





Social & Family History





- Family History


Family Medical History: Noncontributory





- Tobacco Use


Tobacco Use Status *Q: Never Tobacco User


Second Hand Smoke Exposure: No





- Caffeine Use


Caffeine Use: Reports: Coffee





- Recreational Drug Use


Recreational Drug Use: No





ED ROS GENERAL





- Review of Systems


Review Of Systems: See Below





ED EXAM, GENERAL





- Physical Exam


Exam: See Below


  ** #1 Interpretation


EKG Interpretation Comments: 





EKG is normal sinus rhythm rate of 87 bpm with nonspecific ST-T changes no darrell

 ischemia normal intervals normal axis read and interpreted by me





Course





- Vital Signs


Text/Narrative:: 





The portable chest read interpreted by me there is a curious outline of the 

aorta suspicious for a dissection with her symptomatology I will CT her chest 

otherwise the one-view portable chest has no acute findings.








Patient is profoundly anemic at 8.8 she denies any black or tarry stools no 

excessive bleeding a Hemoccult test was done rectal exam was normal and the 

Hemoccult was negative





Departure





- Departure


Disposition: Refer to Observation


Clinical Impression: 


 Acute coronary syndrome, Nonspecific chest pain








- Discharge Information





Sepsis Event Note (ED)





- Evaluation


Sepsis Screening Result: No Definite Risk





<Chip Sweeney - Last Filed: 10/15/20 21:03>





ED HPI GENERAL MEDICAL PROBLEM





- History of Present Illness


INITIAL COMMENTS - FREE TEXT/NARRATIVE: 


Signout received at 7 PM.  Patient CT scan is unremarkable without evidence of 

dissection or PE.  Patient's CTA of her abdomen pelvis reveals likely 

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  Patient reports that she is pain-

free after sublingual nitro.  Patient is low risk for cardiac etiology of chest 

pain however given her age and the fact that her pain was nitro responsive 

patient will be admitted for observation and further cardiac assessment.





Case we discussed with her hospitalist for admission.





I have discussed the plan with the patient and she is in agreement with 

admission for observation.  





Case discussed with Dr. Howard


accepts pt for transfer





  ** #1 Interpretation


EKG Interpretation Comments: 


EKG:


Normal sinus rhythm heart rate of 75


Nonspecific ST-T wave abnormalities


Normal axis


No evidence of ST elevation MI


As interpreted by ER physician: Patel





Course





- Vital Signs


Last Recorded V/S: 


                                Last Vital Signs











Temp  96.1 F L  10/15/20 16:35


 


Pulse  96   10/15/20 19:31


 


Resp  18   10/15/20 19:31


 


BP  125/85   10/15/20 19:31


 


Pulse Ox  99   10/15/20 19:31














- Orders/Labs/Meds


Orders: 


                               Active Orders 24 hr











 Category Date Time Status


 


 Patient Status [ADT] Routine ADT  10/15/20 19:32 Active


 


 Ambulate [RC] ASDIRECTED Care  10/15/20 20:22 Active


 


 Antiembolic Devices [RC] PER UNIT ROUTINE Care  10/15/20 20:23 Active


 


 EKG Documentation Completion [RC] STAT Care  10/15/20 16:49 Active


 


 Oxygen Therapy [RC] PRN Care  10/15/20 20:22 Active


 


 RT Aerosol Therapy [RC] ASDIRECTED Care  10/15/20 20:24 Active


 


 VTE/DVT Education [RC] PER UNIT ROUTINE Care  10/15/20 20:22 Active


 


 Vital Signs [RC] Q4H Care  10/15/20 20:22 Active


 


 Heart Healthy Diet [DIET] Diet  10/15/20 Dinner Active


 


 Ang Chest [CT] Stat Exams  10/15/20 18:00 Taken


 


 FERRITIN [CHEM] Stat Lab  10/15/20 16:48 Received


 


 IRON/TIBC [CHEM] Stat Lab  10/15/20 16:48 Received


 


 TROPONIN I [CHEM] Routine Lab  10/16/20 00:00 Ordered


 


 TROPONIN I [CHEM] Stat Lab  10/15/20 20:33 Received


 


 Albuterol/Ipratropium [DuoNeb 3.0-0.5 MG/3 ML] Med  10/15/20 20:22 Active





 3 ml NEB Q4HRRT PRN   


 


 Sodium Chloride 0.9% [Saline Flush] Med  10/15/20 16:49 Active





 10 ml FLUSH ASDIRECTED PRN   


 


 Sodium Chloride 0.9% [Saline Flush] Med  10/15/20 16:49 Active





 2.5 ml FLUSH ASDIRECTED PRN   


 


 Sodium Chloride 0.9% [Saline Flush] Med  10/15/20 16:49 Active





 2.5 ml FLUSH ASDIRECTED PRN   


 


 Saline Lock Insert [OM.PC] Stat Oth  10/15/20 16:49 Ordered


 


 Sequential Compression Device [OM.PC] Per Unit Routine Oth  10/15/20 20:23 

Ordered


 


 Resuscitation Status Routine Resus Stat  10/15/20 20:22 Ordered








                                Medication Orders





Acetaminophen (Tylenol)  650 mg PO Q6H PRN


   PRN Reason: Pain


Albuterol/Ipratropium (Duoneb 3.0-0.5 Mg/3 Ml)  3 ml NEB Q4HRRT PRN


   PRN Reason: Shortness Of Breath/wheezing


Aspirin (Aspirin)  81 mg PO DAILY ANNAMARIE


Sodium Chloride (Saline Flush)  10 ml FLUSH ASDIRECTED PRN


   PRN Reason: Keep Vein Open


   Last Admin: 10/15/20 17:04  Dose: 10 ml


   Documented by: ADAM


Sodium Chloride (Saline Flush)  2.5 ml FLUSH ASDIRECTED PRN


   PRN Reason: Keep Vein Open


   Last Admin: 10/15/20 17:05  Dose: 2.5 ml


   Documented by: ADAM


Sodium Chloride (Saline Flush)  2.5 ml FLUSH ASDIRECTED PRN


   PRN Reason: Keep Vein Open


   Last Admin: 10/15/20 17:05  Dose: 2.5 ml


   Documented by: ADAM








Labs: 


                                Laboratory Tests











  10/15/20 10/15/20 10/15/20 Range/Units





  16:48 16:48 19:42 


 


WBC  8.08    (4.0-11.0)  K/uL


 


RBC  4.11 L    (4.30-5.90)  M/uL


 


Hgb  8.8 L    (12.0-16.0)  g/dL


 


Hct  29.6 L    (36.0-46.0)  %


 


MCV  72.0 L    (80.0-98.0)  fL


 


MCH  21.4 L    (27.0-32.0)  pg


 


MCHC  29.7 L    (31.0-37.0)  g/dL


 


RDW Std Deviation  47.2    (28.0-62.0)  fl


 


RDW Coeff of Zulma  18 H    (11.0-15.0)  %


 


Plt Count  418 H    (150-400)  K/uL


 


MPV  9.10    (7.40-12.00)  fL


 


Neut % (Auto)  49.7    (48.0-80.0)  %


 


Lymph % (Auto)  35.8    (16.0-40.0)  %


 


Mono % (Auto)  10.1    (0.0-15.0)  %


 


Eos % (Auto)  4.2    (0.0-7.0)  %


 


Baso % (Auto)  0.2    (0.0-1.5)  %


 


Neut # (Auto)  4.0    (1.4-5.7)  K/uL


 


Lymph # (Auto)  2.9 H    (0.6-2.4)  K/uL


 


Mono # (Auto)  0.8    (0.0-0.8)  K/uL


 


Eos # (Auto)  0.3    (0.0-0.7)  K/uL


 


Baso # (Auto)  0.0    (0.0-0.1)  K/uL


 


Nucleated RBC %  0.0    /100WBC


 


Nucleated RBCs #  0    K/uL


 


Sodium   138   (136-145)  mmol/L


 


Potassium   3.8   (3.5-5.1)  mmol/L


 


Chloride   102   ()  mmol/L


 


Carbon Dioxide   26.5   (21.0-32.0)  mmol/L


 


BUN   10   (7.0-18.0)  mg/dL


 


Creatinine   0.8   (0.6-1.0)  mg/dL


 


Est Cr Clr Drug Dosing   71.14   mL/min


 


Estimated GFR (MDRD)   > 60.0   ml/min


 


Glucose   95   ()  mg/dL


 


Calcium   8.5   (8.5-10.1)  mg/dL


 


Total Bilirubin   0.6   (0.2-1.0)  mg/dL


 


AST   24   (15-37)  IU/L


 


ALT   35   (14-63)  IU/L


 


Alkaline Phosphatase   74   ()  U/L


 


Troponin I   < 0.050   (0.000-0.056)  ng/mL


 


Total Protein   7.5   (6.4-8.2)  g/dL


 


Albumin   3.5   (3.4-5.0)  g/dL


 


Globulin   4.0   (2.6-4.0)  g/dL


 


Albumin/Globulin Ratio   0.9   (0.9-1.6)  


 


Lipase   67 L   ()  U/L


 


SARS-CoV-2 RNA (DOMINIK)    NEGATIVE  (NEGATIVE)  











Meds: 


Medications











Generic Name Dose Route Start Last Admin





  Trade Name Xavi  PRN Reason Stop Dose Admin


 


Acetaminophen  650 mg  10/15/20 20:29 





  Tylenol  PO  





  Q6H PRN  





  Pain  


 


Albuterol/Ipratropium  3 ml  10/15/20 20:22 





  Duoneb 3.0-0.5 Mg/3 Ml  NEB  





  Q4HRRT PRN  





  Shortness Of Breath/wheezing  


 


Aspirin  81 mg  10/16/20 09:00 





  Aspirin  PO  





  DAILY ANNAMARIE  


 


Sodium Chloride  10 ml  10/15/20 16:49  10/15/20 17:04





  Saline Flush  FLUSH   10 ml





  ASDIRECTED PRN   Administration





  Keep Vein Open  


 


Sodium Chloride  2.5 ml  10/15/20 16:49  10/15/20 17:05





  Saline Flush  FLUSH   2.5 ml





  ASDIRECTED PRN   Administration





  Keep Vein Open  


 


Sodium Chloride  2.5 ml  10/15/20 16:49  10/15/20 17:05





  Saline Flush  FLUSH   2.5 ml





  ASDIRECTED PRN   Administration





  Keep Vein Open  














Discontinued Medications














Generic Name Dose Route Start Last Admin





  Trade Name Xavi  PRN Reason Stop Dose Admin


 


Aspirin  324 mg  10/15/20 16:49  10/15/20 17:04





  Aspirin  PO  10/15/20 16:50  324 mg





  ONETIME ONE   Administration


 


Sodium Chloride  1,000 mls @ 999 mls/hr  10/15/20 18:57  10/15/20 19:01





  Normal Saline  IV  10/15/20 19:57  999 mls/hr





  .Bolus ONE   Administration


 


Iopamidol  100 ml  10/15/20 18:09  10/15/20 18:15





  Isovue-370 (76%)  IVPUSH  10/15/20 18:10  100 ml





  ONETIME STA   Administration


 


Nitroglycerin  0.4 mg  10/15/20 18:57  10/15/20 19:01





  Nitrostat  SL  10/15/20 18:58  0.4 mg





  ONETIME ONE   Administration














Departure





- Departure


Time of Disposition: 19:18


Condition: Good





Sepsis Event Note (ED)





- Focused Exam


Vital Signs: 


                                   Vital Signs











  Temp Pulse Resp BP BP Pulse Ox


 


 10/15/20 19:31   96  18   125/85  99


 


 10/15/20 19:01     122/83  


 


 10/15/20 18:35   91  18   127/76  98


 


 10/15/20 17:50   85  18   137/80  100


 


 10/15/20 16:35  96.1 F L  95  20   157/101 H  99














- My Orders


Last 24 Hours: 


My Active Orders





10/15/20 19:32


Patient Status [ADT] Routine 














- Assessment/Plan


Last 24 Hours: 


My Active Orders





10/15/20 19:32


Patient Status [ADT] Routine

## 2020-10-15 NOTE — CT
INDICATION:



Chest pain. Possible dissection. 



COMPARISON:



CT angiogram of the chest from 05/18/2015. CT of the abdomen and pelvis 

with contrast from 02/27/2020



TECHNIQUE:



CT angiography of the chest, abdomen, and pelvis was performed with the 

uneventful intravenous administration of 100 cc of Isovue 370 while 1 mm 

thick axial sections were obtained from above the lungs through the pubic 

symphysis. 



Please note that all CT scans at this facility use dose modulation, 

iterative reconstruction, and/or weight-based dosing when appropriate to 

reduce radiation dose to as low as reasonably achievable. 



FINDINGS: :



The thoracic aorta is normal in caliber with no sign of dilatation or 

dissection. There is no sign of periaortic hematoma.



There is stable mild linear density in the anterior inferior right middle 

lobe consistent with scarring from previous inflammatory disease. 



The lungs are otherwise clear with no sign of infiltrate or mass. 



There is excellent enhancement of the pulmonary arteries, with no sign of 

pulmonary embolism. 



There is no sign of mediastinal or hilar mass or adenopathy. 



There is no sign of supraclavicular or axillary mass or adenopathy. 



The heart is normal in appearance for the patient`s age, as are the 

ascending great vessels. 



The abdominal aorta is normal in caliber with no sign of aneurysmal 

dilatation or dissection. 



The celiac axis, SMA, solitary bilateral renal arteries, and JACIEL are widely 

patent. 



There is no sign of periaortic hematoma. 



In the abdomen, the liver, spleen, pancreas, and adrenals are normal in 

appearance. 



Again seen is a 2.0 centimeter diameter cyst arising from the anterior 

upper pole of the left kidney. A tiny subcentimeter cyst is seen nearby, 

slightly more inferiorly. 



The kidneys are otherwise normal in appearance.



The gallbladder is normal in appearance. 



There is no sign of retroperitoneal mass or adenopathy. 



The stomach, loops of small bowel, and colon in the abdomen are normal in 

appearance. 



Again seen is weakening of the fascia in the periumbilical region with 

anterior protrusion of peritoneal contents, without darrell hernia formation. 

In the pelvis, the appendix is nonvisualized, but there is no sign of an 

inflammatory process in the area of the appendix.



The loops of small bowel and colon in the pelvis are normal in appearance. 



Again seen is a giant central and right upper pelvic mass projecting 

superiorly to the level of the lower pole of the right kidney, appearing to 

arise from the superior right uterine fundus. The mass is multilobulated. 

There is prominent peripheral vascularity contiguous with the vessels of 

the otherwise normal-appearing uterine fundus, with relative hypo 

vascularity centrally in the lobules. The mass measures 16.4 x 12.0 

centimeters in cross-section and 13.8 centimeters in length, slightly 

increased in size compared to the previous study where it measured 15.2 x 

11.1 centimeters in cross-section and 13.6 centimeters in length. 



I think it is likely that this large pelvic mass is a massive pedunculated 

fibroid, with the continuity of the vessels in the mass with the vessels of 

the uterine fundus helping confirm this appearance. I cannot entirely 

exclude an ovarian mass however, and I would not recommend biopsy, in order 

to prevent the possibility of peritoneal seeding of a possible ovarian 

malignancy. 



The urinary bladder is normal in appearance. 



There is no sign of pelvic or inguinal mass or adenopathy. 



There is no sign of free fluid or free air in the abdomen or pelvis.



Again seen is grade 1 anterior subluxation of L4 on L5 associated with mild 

L4-5 disc degenerative disease.



IMPRESSION:



Normal CT angiogram of the thoracic and abdominal aorta. No sign of any 

aortic dissection or aneurysmal dilatation to correlate with history of 

pain. 



Normal CT of the chest with contrast.



CT of the abdomen shows small cysts in the left kidney of no clinical 

concern.



CT of the pelvis shows a slight increase in size of the very large pelvic 

mass projecting into the central and right lower abdomen, appearing to be a 

large pedunculated uterine fibroid. It now measures 16.4 x 12.0 x 13.8 

centimeters.



Please note that all CT scans at this facility use dose modulation, 

iterative reconstruction, and/or weight-based dosing when appropriate to 

reduce radiation dose to as low as reasonably achievable.



Dictated by Alli Tobar MD @ Oct 15 2020  6:32PM



Signed by Dr. Alli Tobar @ Oct 15 2020  7:01PM

## 2020-10-16 NOTE — [id number].[id number]
**Discharge Summary





- Hospital Course


Free Text/Narrative:: 





 Patient is a 48 y/o f with no significant known PMH except chronic headaches  

presents with chest pain that is left-sided that began this morning after she 

woke up she states after she got out of bed it felt like a "lightening bolt 

going through her chest" radiated up and down the left upper quadrant up into 

the left shoulder she had some shortness of breath a couple days ago but not 

today with this she denies any cough or fever she not had any leg pain or leg 

swelling she is not had any sweating nausea or vomiting nothing seems to make 

this pain better or worse.  No history of hypertension no diabetes no smoking. 

Patient states she moved here from [facility] and ever since has been sedentary

and has gained a lot of weight. She is alos under a lot of stress at work. 





Initial BP was elevated in ER, Patient underwent CTA chest an abdomen, PE was 

ruled out, CT pelvis showed fibroid. EKG was unremarkable, 1st troponin was 

negative. Nitro helped to ease the pain. Patient is being admitted for 

observation overnight to r/o ACS.  Overnight patients cardiac enzymes were 

trended , which were negative, her labs reveled HbA1c of 6.6, she was 

hypertensive. She was started on metformin,  She was also anemic, iron 

deficiency, was stated on oral iron tablets. Patient was recommended to get a 

stress test done given her risk factors.  





- Discharge Data


Discharge Date: 10/16/20


Discharge Disposition: Home, Self-Care 01


Condition: Fair





- Referral to Home Health


Primary Care Physician: 


PCP None








- Discharge Diagnosis/Problem(s)


(1) Fibroid


SNOMED Code(s): 727359987474907, [id number]


   ICD Code: D21.9 - BENIGN NEOPLASM OF CONNECTIVE AND OTHER SOFT TISSUE, UNSP  

Status: Acute   Current Visit: Yes   





(2) Anemia


SNOMED Code(s): 081677353


   ICD Code: D64.9 - ANEMIA, UNSPECIFIED   Status: Acute   Current Visit: Yes   





- Discharge Plan


Prescriptions/Med Rec: 


Aspirin 81 mg PO DAILY #30 tab.chew


Ferrous Sulfate 300 mg PO BID #60 cup


metFORMIN [Glucophage XR] 500 mg PO WITHDINNER #30 tab.er


atorvaSTATin [Lipitor] 20 mg PO BEDTIME #30 tablet


amLODIPine [Norvasc] 5 mg PO DAILY #30 tablet


Acetaminophen [Tylenol] 650 mg PO Q6H PRN #30 tablet


 PRN Reason: Pain


Home Medications: 


                                    Home Meds





Acetaminophen [Tylenol] 650 mg PO Q6H PRN #30 tablet 10/16/20 [Rx]


Aspirin 81 mg PO DAILY #30 tab.chew 10/16/20 [Rx]


Ferrous Sulfate 300 mg PO BID #60 cup 10/16/20 [Rx]


Vitamin B Complex [B Complex] DAILY 10/16/20 [History]


amLODIPine [Norvasc] 5 mg PO DAILY #30 tablet 10/16/20 [Rx]


atorvaSTATin [Lipitor] 20 mg PO BEDTIME #30 tablet 10/16/20 [Rx]


metFORMIN [Glucophage XR] 500 mg PO WITHDINNER #30 tab.er 10/16/20 [Rx]








Patient Handouts:  Nonspecific Chest Pain, Adult, Easy-to-Read


Referrals: 


Melchor Winter NP [Ordering Only Provider] - 10/29/20 10:00 am





- Discharge Summary/Plan Comment


DC Time >30 min.: No





- Patient Data


Vitals - Most Recent: 


                                Last Vital Signs











Temp  37.0 C   10/16/20 04:26


 


Pulse  90   10/16/20 08:00


 


Resp  16   10/16/20 08:00


 


BP  153/76 H  10/16/20 08:23


 


Pulse Ox  96   10/16/20 08:00











Weight - Most Recent: 88.451 kg


I&O - Last 24 hours: 


                                 Intake & Output











 10/15/20 10/16/20 10/16/20





 22:59 06:59 14:59


 


Intake Total 500 350 


 


Output Total  600 


 


Balance 500 -250 











Lab Results - Last 24 hrs: 


                         Laboratory Results - last 24 hr











  10/15/20 10/15/20 10/15/20 Range/Units





  16:48 16:48 16:48 


 


WBC  8.08    (4.0-11.0)  K/uL


 


RBC  4.11 L    (4.30-5.90)  M/uL


 


Hgb  8.8 L    (12.0-16.0)  g/dL


 


Hct  29.6 L    (36.0-46.0)  %


 


MCV  72.0 L    (80.0-98.0)  fL


 


MCH  21.4 L    (27.0-32.0)  pg


 


MCHC  29.7 L    (31.0-37.0)  g/dL


 


RDW Std Deviation  47.2    (28.0-62.0)  fl


 


RDW Coeff of Zulma  18 H    (11.0-15.0)  %


 


Plt Count  418 H    (150-400)  K/uL


 


MPV  9.10    (7.40-12.00)  fL


 


Neut % (Auto)  49.7    (48.0-80.0)  %


 


Lymph % (Auto)  35.8    (16.0-40.0)  %


 


Mono % (Auto)  10.1    (0.0-15.0)  %


 


Eos % (Auto)  4.2    (0.0-7.0)  %


 


Baso % (Auto)  0.2    (0.0-1.5)  %


 


Neut # (Auto)  4.0    (1.4-5.7)  K/uL


 


Lymph # (Auto)  2.9 H    (0.6-2.4)  K/uL


 


Mono # (Auto)  0.8    (0.0-0.8)  K/uL


 


Eos # (Auto)  0.3    (0.0-0.7)  K/uL


 


Baso # (Auto)  0.0    (0.0-0.1)  K/uL


 


Nucleated RBC %  0.0    /100WBC


 


Nucleated RBCs #  0    K/uL


 


Sodium   138   (136-145)  mmol/L


 


Potassium   3.8   (3.5-5.1)  mmol/L


 


Chloride   102   ()  mmol/L


 


Carbon Dioxide   26.5   (21.0-32.0)  mmol/L


 


BUN   10   (7.0-18.0)  mg/dL


 


Creatinine   0.8   (0.6-1.0)  mg/dL


 


Est Cr Clr Drug Dosing   71.14   mL/min


 


Estimated GFR (MDRD)   > 60.0   ml/min


 


Glucose   95   ()  mg/dL


 


Hemoglobin A1c     (4.5-6.2)  %


 


Calcium   8.5   (8.5-10.1)  mg/dL


 


Iron    26 L  ()  ug/dL


 


TIBC    446  (250-450)  ug/dL


 


% Saturation    5.83 L  (20-55)  %


 


Ferritin    10  (8-252)  ng/mL


 


Total Bilirubin   0.6   (0.2-1.0)  mg/dL


 


AST   24   (15-37)  IU/L


 


ALT   35   (14-63)  IU/L


 


Alkaline Phosphatase   74   ()  U/L


 


Troponin I   < 0.050   (0.000-0.056)  ng/mL


 


Total Protein   7.5   (6.4-8.2)  g/dL


 


Albumin   3.5   (3.4-5.0)  g/dL


 


Globulin   4.0   (2.6-4.0)  g/dL


 


Albumin/Globulin Ratio   0.9   (0.9-1.6)  


 


Triglycerides     (0-200)  mg/dL


 


Cholesterol     ()  mg/dL


 


LDL Cholesterol, Calc     ()  mg/dL


 


VLDL Cholesterol     (5-55)  mg/dL


 


HDL Cholesterol     (40-60)  mg/dL


 


Cholesterol/HDL Ratio     (3.3-6.0)  


 


Lipase   67 L   ()  U/L


 


TSH 3rd Generation     (0.36-3.74)  uIU/mL


 


Urine Color     


 


Urine Appearance     


 


Urine pH     (5.0-8.0)  


 


Ur Specific Gravity     (1.001-1.035)  


 


Urine Protein     (NEGATIVE)  mg/dL


 


Urine Glucose (UA)     (NEGATIVE)  mg/dL


 


Urine Ketones     (NEGATIVE)  mg/dL


 


Urine Occult Blood     (NEGATIVE)  


 


Urine Nitrite     (NEGATIVE)  


 


Urine Bilirubin     (NEGATIVE)  


 


Urine Urobilinogen     (<2.0)  EU/dL


 


Ur Leukocyte Esterase     (NEGATIVE)  


 


SARS-CoV-2 RNA (DOMINIK)     (NEGATIVE)  














  10/15/20 10/15/20 10/15/20 Range/Units





  17:50 19:42 20:33 


 


WBC     (4.0-11.0)  K/uL


 


RBC     (4.30-5.90)  M/uL


 


Hgb     (12.0-16.0)  g/dL


 


Hct     (36.0-46.0)  %


 


MCV     (80.0-98.0)  fL


 


MCH     (27.0-32.0)  pg


 


MCHC     (31.0-37.0)  g/dL


 


RDW Std Deviation     (28.0-62.0)  fl


 


RDW Coeff of Zulma     (11.0-15.0)  %


 


Plt Count     (150-400)  K/uL


 


MPV     (7.40-12.00)  fL


 


Neut % (Auto)     (48.0-80.0)  %


 


Lymph % (Auto)     (16.0-40.0)  %


 


Mono % (Auto)     (0.0-15.0)  %


 


Eos % (Auto)     (0.0-7.0)  %


 


Baso % (Auto)     (0.0-1.5)  %


 


Neut # (Auto)     (1.4-5.7)  K/uL


 


Lymph # (Auto)     (0.6-2.4)  K/uL


 


Mono # (Auto)     (0.0-0.8)  K/uL


 


Eos # (Auto)     (0.0-0.7)  K/uL


 


Baso # (Auto)     (0.0-0.1)  K/uL


 


Nucleated RBC %     /100WBC


 


Nucleated RBCs #     K/uL


 


Sodium     (136-145)  mmol/L


 


Potassium     (3.5-5.1)  mmol/L


 


Chloride     ()  mmol/L


 


Carbon Dioxide     (21.0-32.0)  mmol/L


 


BUN     (7.0-18.0)  mg/dL


 


Creatinine     (0.6-1.0)  mg/dL


 


Est Cr Clr Drug Dosing     mL/min


 


Estimated GFR (MDRD)     ml/min


 


Glucose     ()  mg/dL


 


Hemoglobin A1c     (4.5-6.2)  %


 


Calcium     (8.5-10.1)  mg/dL


 


Iron     ()  ug/dL


 


TIBC     (250-450)  ug/dL


 


% Saturation     (20-55)  %


 


Ferritin     (8-252)  ng/mL


 


Total Bilirubin     (0.2-1.0)  mg/dL


 


AST     (15-37)  IU/L


 


ALT     (14-63)  IU/L


 


Alkaline Phosphatase     ()  U/L


 


Troponin I    < 0.050  (0.000-0.056)  ng/mL


 


Total Protein     (6.4-8.2)  g/dL


 


Albumin     (3.4-5.0)  g/dL


 


Globulin     (2.6-4.0)  g/dL


 


Albumin/Globulin Ratio     (0.9-1.6)  


 


Triglycerides     (0-200)  mg/dL


 


Cholesterol     ()  mg/dL


 


LDL Cholesterol, Calc     ()  mg/dL


 


VLDL Cholesterol     (5-55)  mg/dL


 


HDL Cholesterol     (40-60)  mg/dL


 


Cholesterol/HDL Ratio     (3.3-6.0)  


 


Lipase     ()  U/L


 


TSH 3rd Generation     (0.36-3.74)  uIU/mL


 


Urine Color  YELLOW    


 


Urine Appearance  CLEAR    


 


Urine pH  6.0    (5.0-8.0)  


 


Ur Specific Gravity  1.020    (1.001-1.035)  


 


Urine Protein  NEGATIVE    (NEGATIVE)  mg/dL


 


Urine Glucose (UA)  NEGATIVE    (NEGATIVE)  mg/dL


 


Urine Ketones  NEGATIVE    (NEGATIVE)  mg/dL


 


Urine Occult Blood  NEGATIVE    (NEGATIVE)  


 


Urine Nitrite  NEGATIVE    (NEGATIVE)  


 


Urine Bilirubin  NEGATIVE    (NEGATIVE)  


 


Urine Urobilinogen  0.2    (<2.0)  EU/dL


 


Ur Leukocyte Esterase  NEGATIVE    (NEGATIVE)  


 


SARS-CoV-2 RNA (DOMINIK)   NEGATIVE   (NEGATIVE)  














  10/16/20 10/16/20 10/16/20 Range/Units





  00:17 05:59 05:59 


 


WBC    8.85  (4.0-11.0)  K/uL


 


RBC    3.87 L  (4.30-5.90)  M/uL


 


Hgb    8.4 L  (12.0-16.0)  g/dL


 


Hct    27.8 L  (36.0-46.0)  %


 


MCV    71.8 L  (80.0-98.0)  fL


 


MCH    21.7 L  (27.0-32.0)  pg


 


MCHC    30.2 L  (31.0-37.0)  g/dL


 


RDW Std Deviation    46.9  (28.0-62.0)  fl


 


RDW Coeff of Zulma    18 H  (11.0-15.0)  %


 


Plt Count    409 H  (150-400)  K/uL


 


MPV    9.60  (7.40-12.00)  fL


 


Neut % (Auto)    49.2  (48.0-80.0)  %


 


Lymph % (Auto)    38.6  (16.0-40.0)  %


 


Mono % (Auto)    8.6  (0.0-15.0)  %


 


Eos % (Auto)    3.3  (0.0-7.0)  %


 


Baso % (Auto)    0.3  (0.0-1.5)  %


 


Neut # (Auto)    4.4  (1.4-5.7)  K/uL


 


Lymph # (Auto)    3.4 H  (0.6-2.4)  K/uL


 


Mono # (Auto)    0.8  (0.0-0.8)  K/uL


 


Eos # (Auto)    0.3  (0.0-0.7)  K/uL


 


Baso # (Auto)    0.0  (0.0-0.1)  K/uL


 


Nucleated RBC %    0.0  /100WBC


 


Nucleated RBCs #    0  K/uL


 


Sodium     (136-145)  mmol/L


 


Potassium     (3.5-5.1)  mmol/L


 


Chloride     ()  mmol/L


 


Carbon Dioxide     (21.0-32.0)  mmol/L


 


BUN     (7.0-18.0)  mg/dL


 


Creatinine     (0.6-1.0)  mg/dL


 


Est Cr Clr Drug Dosing     mL/min


 


Estimated GFR (MDRD)     ml/min


 


Glucose     ()  mg/dL


 


Hemoglobin A1c     (4.5-6.2)  %


 


Calcium     (8.5-10.1)  mg/dL


 


Iron     ()  ug/dL


 


TIBC     (250-450)  ug/dL


 


% Saturation     (20-55)  %


 


Ferritin     (8-252)  ng/mL


 


Total Bilirubin     (0.2-1.0)  mg/dL


 


AST     (15-37)  IU/L


 


ALT     (14-63)  IU/L


 


Alkaline Phosphatase     ()  U/L


 


Troponin I  < 0.050    (0.000-0.056)  ng/mL


 


Total Protein     (6.4-8.2)  g/dL


 


Albumin     (3.4-5.0)  g/dL


 


Globulin     (2.6-4.0)  g/dL


 


Albumin/Globulin Ratio     (0.9-1.6)  


 


Triglycerides   91   (0-200)  mg/dL


 


Cholesterol   178   ()  mg/dL


 


LDL Cholesterol, Calc   109   ()  mg/dL


 


VLDL Cholesterol   18   (5-55)  mg/dL


 


HDL Cholesterol   51   (40-60)  mg/dL


 


Cholesterol/HDL Ratio   3.5   (3.3-6.0)  


 


Lipase     ()  U/L


 


TSH 3rd Generation   1.78   (0.36-3.74)  uIU/mL


 


Urine Color     


 


Urine Appearance     


 


Urine pH     (5.0-8.0)  


 


Ur Specific Gravity     (1.001-1.035)  


 


Urine Protein     (NEGATIVE)  mg/dL


 


Urine Glucose (UA)     (NEGATIVE)  mg/dL


 


Urine Ketones     (NEGATIVE)  mg/dL


 


Urine Occult Blood     (NEGATIVE)  


 


Urine Nitrite     (NEGATIVE)  


 


Urine Bilirubin     (NEGATIVE)  


 


Urine Urobilinogen     (<2.0)  EU/dL


 


Ur Leukocyte Esterase     (NEGATIVE)  


 


SARS-CoV-2 RNA (DOMINIK)     (NEGATIVE)  














  10/16/20 Range/Units





  05:59 


 


WBC   (4.0-11.0)  K/uL


 


RBC   (4.30-5.90)  M/uL


 


Hgb   (12.0-16.0)  g/dL


 


Hct   (36.0-46.0)  %


 


MCV   (80.0-98.0)  fL


 


MCH   (27.0-32.0)  pg


 


MCHC   (31.0-37.0)  g/dL


 


RDW Std Deviation   (28.0-62.0)  fl


 


RDW Coeff of Zulma   (11.0-15.0)  %


 


Plt Count   (150-400)  K/uL


 


MPV   (7.40-12.00)  fL


 


Neut % (Auto)   (48.0-80.0)  %


 


Lymph % (Auto)   (16.0-40.0)  %


 


Mono % (Auto)   (0.0-15.0)  %


 


Eos % (Auto)   (0.0-7.0)  %


 


Baso % (Auto)   (0.0-1.5)  %


 


Neut # (Auto)   (1.4-5.7)  K/uL


 


Lymph # (Auto)   (0.6-2.4)  K/uL


 


Mono # (Auto)   (0.0-0.8)  K/uL


 


Eos # (Auto)   (0.0-0.7)  K/uL


 


Baso # (Auto)   (0.0-0.1)  K/uL


 


Nucleated RBC %   /100WBC


 


Nucleated RBCs #   K/uL


 


Sodium   (136-145)  mmol/L


 


Potassium   (3.5-5.1)  mmol/L


 


Chloride   ()  mmol/L


 


Carbon Dioxide   (21.0-32.0)  mmol/L


 


BUN   (7.0-18.0)  mg/dL


 


Creatinine   (0.6-1.0)  mg/dL


 


Est Cr Clr Drug Dosing   mL/min


 


Estimated GFR (MDRD)   ml/min


 


Glucose   ()  mg/dL


 


Hemoglobin A1c  6.6 H  (4.5-6.2)  %


 


Calcium   (8.5-10.1)  mg/dL


 


Iron   ()  ug/dL


 


TIBC   (250-450)  ug/dL


 


% Saturation   (20-55)  %


 


Ferritin   (8-252)  ng/mL


 


Total Bilirubin   (0.2-1.0)  mg/dL


 


AST   (15-37)  IU/L


 


ALT   (14-63)  IU/L


 


Alkaline Phosphatase   ()  U/L


 


Troponin I   (0.000-0.056)  ng/mL


 


Total Protein   (6.4-8.2)  g/dL


 


Albumin   (3.4-5.0)  g/dL


 


Globulin   (2.6-4.0)  g/dL


 


Albumin/Globulin Ratio   (0.9-1.6)  


 


Triglycerides   (0-200)  mg/dL


 


Cholesterol   ()  mg/dL


 


LDL Cholesterol, Calc   ()  mg/dL


 


VLDL Cholesterol   (5-55)  mg/dL


 


HDL Cholesterol   (40-60)  mg/dL


 


Cholesterol/HDL Ratio   (3.3-6.0)  


 


Lipase   ()  U/L


 


TSH 3rd Generation   (0.36-3.74)  uIU/mL


 


Urine Color   


 


Urine Appearance   


 


Urine pH   (5.0-8.0)  


 


Ur Specific Gravity   (1.001-1.035)  


 


Urine Protein   (NEGATIVE)  mg/dL


 


Urine Glucose (UA)   (NEGATIVE)  mg/dL


 


Urine Ketones   (NEGATIVE)  mg/dL


 


Urine Occult Blood   (NEGATIVE)  


 


Urine Nitrite   (NEGATIVE)  


 


Urine Bilirubin   (NEGATIVE)  


 


Urine Urobilinogen   (<2.0)  EU/dL


 


Ur Leukocyte Esterase   (NEGATIVE)  


 


SARS-CoV-2 RNA (DOMINIK)   (NEGATIVE)  











Med Orders - Current: 


                               Current Medications





Acetaminophen (Tylenol)  650 mg PO Q6H PRN


   PRN Reason: Pain


   Last Admin: 10/15/20 22:21 Dose:  650 mg


   Documented by: 


Albuterol/Ipratropium (Duoneb 3.0-0.5 Mg/3 Ml)  3 ml NEB Q4HRRT PRN


   PRN Reason: Shortness Of Breath/wheezing


Amlodipine Besylate (Norvasc)  5 mg PO DAILY [facility]


   Last Admin: 10/16/20 08:23 Dose:  5 mg


   Documented by: 


Aspirin (Aspirin)  81 mg PO DAILY [facility]


   Last Admin: 10/16/20 08:23 Dose:  81 mg


   Documented by: 


Atorvastatin Calcium (Lipitor)  20 mg PO BEDTIME [facility]


Ferrous Sulfate (Ferrous Sulfate)  300 mg PO BID [facility]


Ketorolac Tromethamine (Toradol)  10 mg PO Q6H PRN


   PRN Reason: Pain


   Stop: 10/20/20 22:24


   Last Admin: 10/16/20 06:26 Dose:  10 mg


   Documented by: 


Metformin HCl (Glucophage Xr)  500 mg PO WITHSOFIA [facility]


Sodium Chloride (Saline Flush)  10 ml FLUSH ASDIRECTED PRN


   PRN Reason: Keep Vein Open


   Last Admin: 10/15/20 17:04 Dose:  10 ml


   Documented by: 


Sodium Chloride (Saline Flush)  2.5 ml FLUSH ASDIRECTED PRN


   PRN Reason: Keep Vein Open


   Last Admin: 10/15/20 17:05 Dose:  2.5 ml


   Documented by: 


Sodium Chloride (Saline Flush)  2.5 ml FLUSH ASDIRECTED PRN


   PRN Reason: Keep Vein Open


   Last Admin: 10/15/20 17:05 Dose:  2.5 ml


   Documented by: 





Discontinued Medications





Aspirin (Aspirin)  324 mg PO ONETIME ONE


   Stop: 10/15/20 16:50


   Last Admin: 10/15/20 17:04 Dose:  324 mg


   Documented by: 


Ferrous Sulfate (Ferrous Sulfate)  300 mg PO DAILY [facility]


   Last Admin: 10/16/20 08:23 Dose:  300 mg


   Documented by: 


Sodium Chloride (Normal Saline)  1,000 mls @ 999 mls/hr IV .Bolus ONE


   Stop: 10/15/20 19:57


   Last Admin: 10/15/20 19:01 Dose:  999 mls/hr


   Documented by: 


Iopamidol (Isovue-370 (76%))  100 ml IVPUSH ONETIME STA


   Stop: 10/15/20 18:10


   Last Admin: 10/15/20 18:15 Dose:  100 ml


   Documented by: 


Nitroglycerin (Nitrostat)  0.4 mg SL ONETIME ONE


   Stop: 10/15/20 18:58


   Last Admin: 10/15/20 19:01 Dose:  0.4 mg


   Documented by:

## 2020-10-19 NOTE — CT
INDICATION:

Chest pain. Possible dissection. 



COMPARISON:

CT angiogram of the chest from 05/18/2015. CT of the abdomen and pelvis with 
contrast from 02/27/2020



TECHNIQUE:

CT angiography of the chest, abdomen, and pelvis was performed with the 
uneventful intravenous administration of 100 cc of Isovue 370 while 1 mm thick 
axial sections were obtained from above the lungs through the pubic symphysis. 



Please note that all CT scans at this facility use dose modulation, iterative 
reconstruction, and/or weight-based dosing when appropriate to reduce radiation 
dose to as low as reasonably achievable. 



FINDINGS: :

The thoracic aorta is normal in caliber with no sign of dilatation or 
dissection. There is no sign of periaortic hematoma.



There is stable mild linear density in the anterior inferior right middle lobe 
consistent with scarring from previous inflammatory disease. 



The lungs are otherwise clear with no sign of infiltrate or mass. 



There is excellent enhancement of the pulmonary arteries, with no sign of 
pulmonary embolism. 



There is no sign of mediastinal or hilar mass or adenopathy. 



There is no sign of supraclavicular or axillary mass or adenopathy. 



The heart is normal in appearance for the patient`s age, as are the ascending 
great vessels. 



The abdominal aorta is normal in caliber with no sign of aneurysmal dilatation 
or dissection. 



The celiac axis, SMA, solitary bilateral renal arteries, and JACIEL are widely 
patent. 



There is no sign of periaortic hematoma. 



In the abdomen, the liver, spleen, pancreas, and adrenals are normal in 
appearance. 



Again seen is a 2.0 centimeter diameter cyst arising from the anterior upper 
pole of the left kidney. A tiny subcentimeter cyst is seen nearby, slightly more
inferiorly. 



The kidneys are otherwise normal in appearance.



The gallbladder is normal in appearance. 



There is no sign of retroperitoneal mass or adenopathy. 



The stomach, loops of small bowel, and colon in the abdomen are normal in 
appearance. 



Again seen is weakening of the fascia in the periumbilical region with anterior 
protrusion of peritoneal contents, without darrell hernia formation. 



In the pelvis, the appendix is nonvisualized, but there is no sign of an 
inflammatory process in the area of the appendix.



The loops of small bowel and colon in the pelvis are normal in appearance. 



Again seen is a giant central and right upper pelvic mass projecting superiorly 
to the level of the lower pole of the right kidney, appearing to arise from the 
superior right uterine fundus. The mass is multilobulated. There is prominent 
peripheral vascularity contiguous with the vessels of the otherwise normal-
appearing uterine fundus, with relative hypo vascularity centrally in the 
lobules. The mass measures 16.4 x 12.0 centimeters in cross-section and 13.8 
centimeters in length, slightly increased in size compared to the previous study
where it measured 15.2 x 11.1 centimeters in cross-section and 13.6 centimeters 
in length. 



I think it is likely that this large pelvic mass is a massive pedunculated 
fibroid, with the continuity of the vessels in the mass with the vessels of the 
uterine fundus helping confirm this appearance. I cannot entirely exclude an 
ovarian mass however, and I would not recommend biopsy, in order to prevent the 
possibility of peritoneal seeding of a possible ovarian malignancy. 



The urinary bladder is normal in appearance. 



There is no sign of pelvic or inguinal mass or adenopathy. 



There is no sign of free fluid or free air in the abdomen or pelvis.



Again seen is grade 1 anterior subluxation of L4 on L5 associated with mild L4-5
disc degenerative disease.



IMPRESSION:

Normal CT angiogram of the thoracic and abdominal aorta. No sign of any aortic 
dissection or aneurysmal dilatation to correlate with history of pain. 

Normal CT of the chest with contrast.

CT of the abdomen shows small cysts in the left kidney of no clinical concern.

CT of the pelvis shows a slight increase in size of the very large pelvic mass 
projecting into the central and right lower abdomen, appearing to be a large 
pedunculated uterine fibroid. It now measures 16.4 x 12.0 x 13.8 centimeters.



Please note that all CT scans at this facility use dose modulation, iterative 
reconstruction, and/or weight-based dosing when appropriate to reduce radiation 
dose to as low as reasonably achievable.



Dictated by Alli Tobar MD @ Oct 15 2020 6:32PM

Signed by: Alli Tobar MD @10/15/2020 7:01:08 PM

(Electronic Signature)



[id number]

## 2021-03-18 NOTE — CR
Indication:



Chest pain



Technique:



Chest 1 view



Comparison:



February 27, 2020



Findings/Impression:



Stable cardiomediastinal silhouette. Normal pulmonary vasculature. Clear 

lungs and pleural spaces. No acute osseous abnormality.



Dictated by Yessi Talavera MD @ Oct 15 2020  5:29PM



Signed by Dr. Yessi Talavera @ Oct 15 2020  5:30PM
soft/nondistended/nontender